# Patient Record
Sex: MALE | Race: WHITE
[De-identification: names, ages, dates, MRNs, and addresses within clinical notes are randomized per-mention and may not be internally consistent; named-entity substitution may affect disease eponyms.]

---

## 2021-02-11 ENCOUNTER — HOSPITAL ENCOUNTER (OUTPATIENT)
Dept: HOSPITAL 53 - M LAB REF | Age: 36
End: 2021-02-11
Attending: NURSE PRACTITIONER
Payer: COMMERCIAL

## 2021-02-11 DIAGNOSIS — R94.5: Primary | ICD-10-CM

## 2021-02-11 LAB
FERRITIN SERPL-MCNC: 228 NG/ML (ref 26–388)
HBV CORE IGM SER QL: NEGATIVE
HBV SURFACE AB SER-ACNC: NEGATIVE M[IU]/ML
HCT VFR BLD AUTO: 46.2 % (ref 42–52)
HCV AB SER QL: 0.1 INDEX (ref ?–0.8)
HEPATITIS A ANTIBODY IGM: NEGATIVE
IRON SATN MFR SERPL: 22.1 % (ref 19.7–50)
IRON SERPL-MCNC: 64 UG/DL (ref 65–175)
TIBC SERPL-MCNC: 290 UG/DL (ref 250–450)

## 2021-02-12 LAB — FOLATE RBC-MCNC: 459 NG/ML (ref 280–791)

## 2021-02-13 LAB — CERULOPLASMIN SERPL-MCNC: 23.7 MG/DL (ref 16–31)

## 2021-02-18 ENCOUNTER — HOSPITAL ENCOUNTER (OUTPATIENT)
Dept: HOSPITAL 53 - M RAD | Age: 36
End: 2021-02-18
Attending: NURSE PRACTITIONER
Payer: COMMERCIAL

## 2021-02-18 DIAGNOSIS — R94.5: Primary | ICD-10-CM

## 2021-02-18 NOTE — REP
INDICATION:

ELEVATED LFT'S



COMPARISON:

None.



TECHNIQUE:

Real time gray scale ultrasound examination using curved array transducer.



FINDINGS:

Liver is minimally enlarged at 18 cm craniocaudal length with normal contour and

echogenicity.  No focal hepatic lesions are identified.



Pancreas is incompletely evaluated due to interposed bowel gas.



The gallbladder is normal and without gallstones, wall thickening, or pericholecystic

fluid.  No biliary ductal dilatation is appreciated and the common bile duct measures

4.1 mm diameter.



Right kidney is normal in reniform shape without hydronephrosis and measures 11.0 x

5.1 x 5.4 cm.



No ascites in the visualized right upper quadrant.











IMPRESSION:

Liver is minimally enlarged.  Otherwise normal right upper quadrant ultrasound.





<Electronically signed by Gustavo Davis > 02/18/21 [id number]

## 2022-07-07 ENCOUNTER — HOSPITAL ENCOUNTER (OUTPATIENT)
Dept: HOSPITAL 53 - M LAB REF | Age: 37
End: 2022-07-07
Attending: NURSE PRACTITIONER
Payer: COMMERCIAL

## 2022-07-07 DIAGNOSIS — R94.5: Primary | ICD-10-CM

## 2022-07-07 LAB
HBV CORE IGM SER QL: NEGATIVE
HBV SURFACE AB SER-ACNC: NEGATIVE M[IU]/ML
HCV AB SER QL: 0.1 INDEX (ref ?–0.8)
HIV 1+2 AB+HIV1 P24 AG SERPL QL IA: NEGATIVE